# Patient Record
Sex: FEMALE | Race: BLACK OR AFRICAN AMERICAN
[De-identification: names, ages, dates, MRNs, and addresses within clinical notes are randomized per-mention and may not be internally consistent; named-entity substitution may affect disease eponyms.]

---

## 2019-12-31 ENCOUNTER — HOSPITAL ENCOUNTER (EMERGENCY)
Dept: HOSPITAL 18 - NAV ERS | Age: 54
Discharge: HOME | End: 2019-12-31
Payer: SELF-PAY

## 2019-12-31 DIAGNOSIS — S40.021A: ICD-10-CM

## 2019-12-31 DIAGNOSIS — F41.9: ICD-10-CM

## 2019-12-31 DIAGNOSIS — Z79.899: ICD-10-CM

## 2019-12-31 DIAGNOSIS — E11.9: ICD-10-CM
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
## 2019-12-31 DIAGNOSIS — I10: ICD-10-CM

## 2019-12-31 DIAGNOSIS — Z23: ICD-10-CM

## 2019-12-31 DIAGNOSIS — S05.12XA: Primary | ICD-10-CM

## 2019-12-31 DIAGNOSIS — Z79.4: ICD-10-CM

## 2019-12-31 PROCEDURE — 90715 TDAP VACCINE 7 YRS/> IM: CPT

## 2019-12-31 PROCEDURE — 70450 CT HEAD/BRAIN W/O DYE: CPT

## 2019-12-31 PROCEDURE — 90471 IMMUNIZATION ADMIN: CPT

## 2019-12-31 PROCEDURE — 70486 CT MAXILLOFACIAL W/O DYE: CPT

## 2019-12-31 NOTE — CT
EXAM:

CT Brain WO Con



PROVIDED CLINICAL HISTORY:

Pain status post injury



COMPARISON:

None



FINDINGS:

The ventricular system appears normal in size and morphology. There is no evidence for intracranial h
emorrhage or mass effect. Frontal scalp swelling left periorbital/premaxillary soft tissue swelling

are demonstrated. There is no evidence for fracture.



IMPRESSION:

No evidence for intracranial hemorrhage or fracture.



Reported By: García Ugarte 

Electronically Signed:  12/31/2019 8:24 PM

## 2019-12-31 NOTE — CT
EXAM:

CT Facial Bones WO Con



PROVIDED CLINICAL HISTORY:

Trauma with pain



COMPARISON:

None



FINDINGS:

Left periorbital and premaxillary soft tissue swelling without evidence for fracture. Paranasal sinus
es appear within opacity. The globes and other orbital contents appear unremarkable.



IMPRESSION:

No evidence for fracture.



Reported By: García Ugarte 

Electronically Signed:  12/31/2019 8:27 PM